# Patient Record
Sex: FEMALE | Race: WHITE | NOT HISPANIC OR LATINO | Employment: STUDENT | URBAN - METROPOLITAN AREA
[De-identification: names, ages, dates, MRNs, and addresses within clinical notes are randomized per-mention and may not be internally consistent; named-entity substitution may affect disease eponyms.]

---
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
## 2023-05-23 ENCOUNTER — OFFICE VISIT (OUTPATIENT)
Dept: FAMILY MEDICINE | Facility: CLINIC | Age: 18
End: 2023-05-23
Payer: COMMERCIAL

## 2023-05-23 VITALS
HEART RATE: 98 BPM | HEIGHT: 62 IN | SYSTOLIC BLOOD PRESSURE: 136 MMHG | BODY MASS INDEX: 28.3 KG/M2 | DIASTOLIC BLOOD PRESSURE: 84 MMHG | RESPIRATION RATE: 16 BRPM | WEIGHT: 153.8 LBS | OXYGEN SATURATION: 99 %

## 2023-05-23 DIAGNOSIS — F90.9 ATTENTION DEFICIT HYPERACTIVITY DISORDER (ADHD), UNSPECIFIED ADHD TYPE: Primary | ICD-10-CM

## 2023-05-23 DIAGNOSIS — E55.9 VITAMIN D DEFICIENCY: ICD-10-CM

## 2023-05-23 DIAGNOSIS — R03.0 ELEVATED BLOOD PRESSURE READING WITHOUT DIAGNOSIS OF HYPERTENSION: ICD-10-CM

## 2023-05-23 PROBLEM — E66.9 CHILDHOOD OBESITY: Status: ACTIVE | Noted: 2020-10-15

## 2023-05-23 PROBLEM — Z97.5 IUD (INTRAUTERINE DEVICE) IN PLACE: Status: ACTIVE | Noted: 2022-08-30

## 2023-05-23 PROBLEM — L70.8 OTHER ACNE: Status: ACTIVE | Noted: 2022-08-30

## 2023-05-23 PROBLEM — Q62.5 DUPLICATED RENAL COLLECTING SYSTEM: Status: ACTIVE | Noted: 2022-08-30

## 2023-05-23 PROBLEM — E78.1 HIGH TRIGLYCERIDES: Status: ACTIVE | Noted: 2022-08-30

## 2023-05-23 PROBLEM — N92.0 MENORRHAGIA: Status: ACTIVE | Noted: 2023-05-23

## 2023-05-23 PROBLEM — F48.1: Status: ACTIVE | Noted: 2023-05-23

## 2023-05-23 PROBLEM — F33.8 SEASONAL AFFECTIVE DISORDER (H): Status: ACTIVE | Noted: 2022-08-30

## 2023-05-23 PROBLEM — F41.1 GAD (GENERALIZED ANXIETY DISORDER): Status: ACTIVE | Noted: 2022-08-30

## 2023-05-23 LAB
ALBUMIN SERPL BCG-MCNC: 4.6 G/DL (ref 3.5–5.2)
ALP SERPL-CCNC: 90 U/L (ref 35–104)
ALT SERPL W P-5'-P-CCNC: 16 U/L (ref 10–35)
ANION GAP SERPL CALCULATED.3IONS-SCNC: 13 MMOL/L (ref 7–15)
AST SERPL W P-5'-P-CCNC: 22 U/L (ref 10–35)
BILIRUB SERPL-MCNC: 0.3 MG/DL
BUN SERPL-MCNC: 8 MG/DL (ref 6–20)
CALCIUM SERPL-MCNC: 9.7 MG/DL (ref 8.6–10)
CHLORIDE SERPL-SCNC: 104 MMOL/L (ref 98–107)
CREAT SERPL-MCNC: 0.68 MG/DL (ref 0.51–0.95)
DEPRECATED HCO3 PLAS-SCNC: 24 MMOL/L (ref 22–29)
ERYTHROCYTE [DISTWIDTH] IN BLOOD BY AUTOMATED COUNT: 12.3 % (ref 10–15)
GFR SERPL CREATININE-BSD FRML MDRD: >90 ML/MIN/1.73M2
GLUCOSE SERPL-MCNC: 92 MG/DL (ref 70–99)
HCT VFR BLD AUTO: 42 % (ref 35–47)
HGB BLD-MCNC: 14.3 G/DL (ref 11.7–15.7)
MCH RBC QN AUTO: 31.4 PG (ref 26.5–33)
MCHC RBC AUTO-ENTMCNC: 34 G/DL (ref 31.5–36.5)
MCV RBC AUTO: 92 FL (ref 78–100)
PLATELET # BLD AUTO: 292 10E3/UL (ref 150–450)
POTASSIUM SERPL-SCNC: 3.7 MMOL/L (ref 3.4–5.3)
PROT SERPL-MCNC: 7.5 G/DL (ref 6.4–8.3)
RBC # BLD AUTO: 4.56 10E6/UL (ref 3.8–5.2)
SODIUM SERPL-SCNC: 141 MMOL/L (ref 136–145)
WBC # BLD AUTO: 10.2 10E3/UL (ref 4–11)

## 2023-05-23 PROCEDURE — 80053 COMPREHEN METABOLIC PANEL: CPT | Performed by: FAMILY MEDICINE

## 2023-05-23 PROCEDURE — 85027 COMPLETE CBC AUTOMATED: CPT | Performed by: FAMILY MEDICINE

## 2023-05-23 PROCEDURE — 99213 OFFICE O/P EST LOW 20 MIN: CPT | Performed by: FAMILY MEDICINE

## 2023-05-23 PROCEDURE — 83735 ASSAY OF MAGNESIUM: CPT | Mod: 90 | Performed by: FAMILY MEDICINE

## 2023-05-23 PROCEDURE — 82306 VITAMIN D 25 HYDROXY: CPT | Performed by: FAMILY MEDICINE

## 2023-05-23 PROCEDURE — 99000 SPECIMEN HANDLING OFFICE-LAB: CPT | Performed by: FAMILY MEDICINE

## 2023-05-23 PROCEDURE — 36415 COLL VENOUS BLD VENIPUNCTURE: CPT | Performed by: FAMILY MEDICINE

## 2023-05-23 RX ORDER — BUSPIRONE HYDROCHLORIDE 10 MG/1
1 TABLET ORAL
COMMUNITY
Start: 2023-04-17

## 2023-05-23 RX ORDER — LISDEXAMFETAMINE DIMESYLATE 20 MG/1
20 CAPSULE ORAL DAILY
COMMUNITY
Start: 2023-04-17 | End: 2023-05-31

## 2023-05-23 RX ORDER — BUPROPION HYDROCHLORIDE 300 MG/1
TABLET ORAL
COMMUNITY
Start: 2023-05-15

## 2023-05-23 ASSESSMENT — PATIENT HEALTH QUESTIONNAIRE - PHQ9
SUM OF ALL RESPONSES TO PHQ QUESTIONS 1-9: 4
10. IF YOU CHECKED OFF ANY PROBLEMS, HOW DIFFICULT HAVE THESE PROBLEMS MADE IT FOR YOU TO DO YOUR WORK, TAKE CARE OF THINGS AT HOME, OR GET ALONG WITH OTHER PEOPLE: SOMEWHAT DIFFICULT
SUM OF ALL RESPONSES TO PHQ QUESTIONS 1-9: 4

## 2023-05-23 NOTE — PROGRESS NOTES
"  Assessment & Plan   Problem List Items Addressed This Visit     ADHD - Primary    Relevant Orders    REVIEW OF HEALTH MAINTENANCE PROTOCOL ORDERS (Completed)   Other Visit Diagnoses     Elevated blood pressure reading without diagnosis of hypertension        Relevant Orders    Magnesium RBC (Completed)    Comprehensive metabolic panel (Completed)    CBC with platelets (Completed)    Vitamin D deficiency        Relevant Orders    Vitamin D Deficiency (Completed)             Patient Instructions   Consider following supplement:    To order supplements go to the web site: Sample6  Use my authorization number to order the recommended supplements: S99     Acumen supplement ( bacopa)  Magnesium glycinate 100 mg - Take 4 at bedtime  VitaPrime Iron-free multivitamins with minerals 1 twice daily     Driven to Distraction - Dr. Carlos Martinez  Open Focus Brain- Dr. Mark Worthington    DASH diet - increase fruits / vegetables in diet.  [email or web address]  [email or web address]        BMI:   Estimated body mass index is 28.59 kg/m  as calculated from the following:    Height as of this encounter: 1.562 m (5' 1.5\").    Weight as of this encounter: 69.8 kg (153 lb 12.8 oz).           Ankita Huggins MD  [facility]    Veena Foley is a 17 year old, presenting for the following health issues:  New Functional Medicine Consult (Discuss ADHD and BP )        5/23/2023     4:01 PM   Additional Questions   Roomed by charis MAGDALENO       ADD - She was dx with depression/ anxiety age 13/13 yo.  As child her teachers referred her for ADD testing.  She was borderline for diagnosis and parents did not want her to take stimulants.  Had organizational  in middle school.  She got tested in past year and was dx with ADD and started on Vyvanse and her BP increased.  Stopped med and BP still running high.  Doing ok " "off Vyvanse.  Doing on-line summer class.    Elevated blood pressure - Both parents/ uncle/ grandparents had HTN.      SH: Lives with 2 roommates in [place] [facility] [place] [facility] [place]. 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  Nutrition: B- toast / peanut butte/ yogurt / granola / blueberries.  L- rice/ tofu. D- Rice and beans/ pizza/ noodles.  Vegetarian.  Water/ coffee.  Sugar - off and on.  Movement: walks to school.  Social:   Has progesterone IUD.  Supplements: Fish oil/ vitamin D/ garlic / cinnamon.    Patient Active Problem List   Diagnosis     Sinus congestion     Overweight     Childhood obesity     ADHD     Derealization syndrome (H)     Duplicated renal collecting system     SAVANNAH (generalized anxiety disorder)     High triglycerides     IUD (intrauterine device) in place     Menorrhagia     Other acne     Seasonal affective disorder (H)          Objective    /84 (BP Location: Left arm, Patient Position: Sitting, Cuff Size: Adult Regular)   Pulse 98   Resp 16   Ht 1.562 m (5' 1.5\")   Wt 69.8 kg (153 lb 12.8 oz)   LMP  (LMP Unknown)   SpO2 99%   BMI 28.59 kg/m    Body mass index is 28.59 kg/m .  Physical Exam         GENERAL: Healthy, alert and no distress  EYES: Eyes grossly normal to inspection.  No discharge or erythema, or obvious scleral/conjunctival abnormalities.  RESP: No audible wheeze, cough, or visible cyanosis.  No visible retractions or increased work of breathing.    SKIN: Visible skin clear. No significant rash, abnormal pigmentation or lesions.  NEURO: Cranial nerves grossly intact.  Mentation and speech appropriate for age.  PSYCH: Mentation appears normal, affect normal/bright, judgement and insight intact, normal speech and appearance well-groomed.      MSQ: 54  PSS: 11    Answers for HPI/ROS submitted by the patient on 5/23/2023  If you checked off any problems, how difficult have these problems made it for you to do your work, take care of " things at home, or get along with other people?: Somewhat difficult  PHQ9 TOTAL SCORE: 4    I reviewed past labs.    Ankita Huggins MD

## 2023-05-23 NOTE — PATIENT INSTRUCTIONS
Consider following supplement:    To order supplements go to the web site: [employer]  Use my authorization number to order the recommended supplements: S99     Acumen supplement ( bacopa)  Magnesium glycinate 100 mg - Take 4 at bedtime  VitaPrime Iron-free multivitamins with minerals 1 twice daily     Driven to Distraction - Dr. Carlos Martinez  Open Focus Brain- Dr. Mark Worthington    DASH diet - increase fruits / vegetables in diet.  [email or web address]  [email or web address]

## 2023-05-24 LAB — DEPRECATED CALCIDIOL+CALCIFEROL SERPL-MC: 41 UG/L (ref 20–75)

## 2023-05-28 LAB — MAGNESIUM RBC-SCNC: 5.4 MG/DL

## 2023-06-30 ENCOUNTER — TELEPHONE (OUTPATIENT)
Dept: FAMILY MEDICINE | Facility: CLINIC | Age: 18
End: 2023-06-30

## 2023-06-30 NOTE — TELEPHONE ENCOUNTER
Patient Quality Outreach    Patient is due for the following:   Physical Preventive Adult Physical  Chlamydia Screening    Next Steps:   Schedule a Adult Preventative     Type of outreach:    Phone, left message for patient/parent to call back.      Questions for provider review:    None           Sage Benitez  Chart routed to Care Team.

## 2023-07-08 ENCOUNTER — HEALTH MAINTENANCE LETTER (OUTPATIENT)
Age: 18
End: 2023-07-08

## 2023-09-06 NOTE — TELEPHONE ENCOUNTER
Patient Quality Outreach    Patient is due for the following:   Physical Preventive Adult Physical  Chlamydia Screening    Next Steps:   Schedule a Adult Preventative    Type of outreach:    Phone, spoke to patient/parent. She only see Dr. Huggins for functional medicine and will follow-up with her PCP. xl    Questions for provider review:               Sage Benitez

## 2024-08-31 ENCOUNTER — HEALTH MAINTENANCE LETTER (OUTPATIENT)
Age: 19
End: 2024-08-31